# Patient Record
(demographics unavailable — no encounter records)

---

## 2017-04-26 NOTE — REP
Clinical:  Anatomical evaluation.

 

Comparison: None .

 

Findings:

Examination demonstrates a single live intrauterine pregnancy in the breech

presentation.  Fetal motion is identified by technologist.  Placenta is noted

posteriorly and grade zero without evidence for placenta previa or abruption.

Right fundal fibroid measures 6 cm maximal diameter; left lower uterine segment

fibroid measures 4.1 cm. Amniotic fluid volume is normal.  Cervix measures 4.7 cm

in length and appears closed.  No evidence for nuchal cord.

 

Gestational age by LMP 19 weeks 3 days with NICK 08/16/2017 .

Gestational age by current measurements 19 weeks 5 day with NICK 09/14/2017 .

 

FHR equals 150 beats per minute.

BPD  4.7 cm     20 weeks 1 day

HC  17.5 cm     20 weeks 0 days

AC  13.9 cm     19 weeks 2 days

FL  3.2 cm      20 weeks 0 days

HL  3.1 cm      20 weeks 0 day

HC/AC ratio  1.26

 

Estimated fetal weight 309 grams ( 58th percentile).

 

Anatomical assessment demonstrates normal structures including cranium, choroid

plexus, cavum, cerebellum/posterior fossa, facial features, lungs, diaphragm,

stomach, cord insertion/three-vessel cord, kidneys/bladder, spine, and

extremities. Limited evaluation of the heart/ventricular outflow tracts

 

Impression:

1.  Single live intrauterine pregnancy in breech presentation demonstrating

appropriate interval growth.

2.  Uterine fibroids.

3.  Limited evaluation of the heart and ventricular outflow tracts with otherwise

normal anatomical assessment.

 

 

Signed by

Mitchell Rajan MD 04/26/2017 05:58 A

## 2017-05-24 NOTE — REP
Clinical:  Anatomical evaluation.

 

Comparison: 2017 .

 

Findings:

Examination demonstrates a single live intrauterine pregnancy in cephalic

presentation.  Fetal motion is identified by technologist.  Placenta is noted

anteriorly and grade zero without evidence for placenta previa or abruption.

Amniotic fluid volume is normal.  Cervix measures 4.7 cm in length and appears

closed.  Fibroids again noted and unchanged.  No evidence for nuchal cord.

 

Gestational age by LMP 23 weeks 4 days with NICK 2017 .

Gestational age by current measurements 24 weeks 0 days with NICK 2017 .

 

FHR equals 141 beats per minute.

Estimated fetal weight 666 grams ( 61st percentile).

 

Anatomical assessment demonstrates normal structures including cranium, choroid

plexus, cavum, cerebellum/posterior fossa, facial features, lungs, four-chamber

heart/ventricular outflow tracts, diaphragm, stomach, cord insertion/three-vessel

cord, bladder, spine, and extremities. Mild renal pelviectasis (right greater

than left) may warrant followup.

 

Impression:

Single live intrauterine pregnancy in cephalic presentation demonstrating

appropriate interval growth.  In conjunction with prior examination anatomical

assessment is complete and relatively normal.  Current examination suggests mild

renal pelviectasis which may warrant  follow-up examination.

 

 

Signed by

Mitchell Rajan MD 2017 02:43 P

## 2017-07-20 NOTE — REP
OBSTETRIC SONOGRAPHY:

 

HISTORY:  Pregnancy 30+ weeks for fetal growth.  Comparison study May 24, 2017.

 

FINDINGS:  Scanning through the gravid uterus demonstrates a viable single

intrauterine gestation in a cephalic fetal lie.  Fetal heart rate is recorded 141

beats per minute.  A posterior placenta is seen without evidence of previa.

Amniotic fluid is subjectively normal.  VANESSA is normal at 11.5 cm.  S/D ratio in

the umbilical cord artery by Doppler is normal at 2.96.  Previously noted

fibroids are again seen today on the right measuring 3.5 x 2.8 x 2.7 and on the

left measuring 3.1 x 2.4 x 3.2 cm.  There has been appropriate interval growth.

Normal fetal cranium, face and profile, lungs, four-chamber heart, diaphragm,

kidneys and bladder are seen today.

 

Biometry Chart:

 

BPD  8.2 cm = 33 weeks 1 day

 

HC  29.2 cm = 32 weeks 2 days

 

AC   27.1 cm = 31 weeks 1 day

 

FL      6.1 cm = 31 weeks 5 days

 

HL    5.6 cm = 32 weeks 2 days

 

CD    4.4 cm = 34 weeks 1 day

 

HC/AC ratio normal 1.08

 

Cephalic index normal 0.8.

 

Estimated fetal weight 1797 grams, 3 pounds 15 ounces, 40th percentile for 31

weeks 5 days.

 

IMPRESSION: Viable single intrauterine gestation at 31 weeks 4 days by today's

composite sonographic criteria.  Expected gestational age estimate based on prior

sonography is 31 weeks 5 days.  There is appropriate interval growth.  Uterine

fibroids again seen.

 

 

Signed by

Manas Reddy MD 07/20/2017 04:41 P

## 2017-08-14 NOTE — REP
Obstetric ultrasound for fetal growth:

 

There is a single intrauterine gestation in a vertex presentation.  The fetal

heart rate is 133 beats per minute.  The placenta is posterior.  There is no

placenta previa.

 

The amniotic fluid volume subjectively is normal.  The amniotic fluid index is

11.2 (7.8 - 24.9).

 

The gestational age by the the ultrasound today is 34 weeks 5 days with an NICK of

09/20/2017.

 

Gestational age by the first ultrasound during this gestation is 35 weeks 2 days

and by LMP is 35 weeks 2 days.

 

Fetal weight is 2542 grams (5 pounds, 9 ounces).  This is the 42nd percentile for

35 weeks 2 days.  Umbilical artery Doppler assessment:

 

SD ratio 2.65.

Resistive index 0.63

Diastolic flow velocity 17.0 cm/sec.

These values are in their normal ranges.

 

 

Signed by

Angelito Fang MD 08/14/2017 10:52 A